# Patient Record
Sex: FEMALE | Race: BLACK OR AFRICAN AMERICAN | Employment: FULL TIME | URBAN - METROPOLITAN AREA
[De-identification: names, ages, dates, MRNs, and addresses within clinical notes are randomized per-mention and may not be internally consistent; named-entity substitution may affect disease eponyms.]

---

## 2017-07-05 ENCOUNTER — OFFICE VISIT (OUTPATIENT)
Dept: INTERNAL MEDICINE CLINIC | Age: 47
End: 2017-07-05

## 2017-07-05 VITALS
WEIGHT: 212 LBS | HEART RATE: 84 BPM | BODY MASS INDEX: 33.27 KG/M2 | HEIGHT: 67 IN | OXYGEN SATURATION: 99 % | SYSTOLIC BLOOD PRESSURE: 129 MMHG | DIASTOLIC BLOOD PRESSURE: 85 MMHG | RESPIRATION RATE: 18 BRPM | TEMPERATURE: 98.3 F

## 2017-07-05 DIAGNOSIS — K13.79 MOUTH SORE: Primary | ICD-10-CM

## 2017-07-05 RX ORDER — PENICILLIN V POTASSIUM 500 MG/1
500 TABLET, FILM COATED ORAL 3 TIMES DAILY
Qty: 30 TAB | Refills: 0 | Status: SHIPPED | OUTPATIENT
Start: 2017-07-05 | End: 2019-01-09

## 2017-07-05 RX ORDER — LATANOPROST 50 UG/ML
SOLUTION/ DROPS OPHTHALMIC
Refills: 0 | COMMUNITY
Start: 2017-04-11 | End: 2019-01-09

## 2017-07-05 NOTE — PROGRESS NOTES
Chief Complaint   Patient presents with    Mouth Pain     pt states she bit down on her cheek a little over a week ago and its now painful and feel like its swollen      1. Have you been to the ER, urgent care clinic since your last visit? Hospitalized since your last visit? No    2. Have you seen or consulted any other health care providers outside of the [address] [place] [address] since your last visit? Include any pap smears or colon screening.  Yes Where: endo

## 2017-07-05 NOTE — PROGRESS NOTES
HISTORY OF PRESENT ILLNESS  Nely Moraes is a 52 y.o. female. HPI Ms. Charisse Teresa is here for pain in the right side of her cheek. She accidentally bit the inside of her cheek and has re-bit it a few times. She has had some increasing pain in her cheek and in her jaw. She attempted to get in with her dentist, but he is out of town. She does have her wisdom teeth in that give her some issues from time to time. She denies fever. Review of Systems   Constitutional: Negative. HENT: Negative. Mouth pain   Eyes: Negative. Respiratory: Negative. Physical Exam   Constitutional: She appears well-developed and well-nourished. No distress. HENT:   Head: Normocephalic and atraumatic. There is a swollen area in her right inner cheek. Does not appear to be an abscess. Advised her that I can give her PCN to start, but she needs to follow up with her dentist, b/c I am not certain that this area in her cheek is causing her sx. Cardiovascular: Normal rate. Pulmonary/Chest: Effort normal.   Lymphadenopathy:     She has no cervical adenopathy. Visit Vitals    /85 (BP 1 Location: Left arm, BP Patient Position: Sitting)    Pulse 84    Temp 98.3 °F (36.8 °C) (Oral)    Resp 18    Ht 5' 7\" (1.702 m)    Wt 212 lb (96.2 kg)    SpO2 99%    BMI 33.2 kg/m2       ASSESSMENT and PLAN    ICD-10-CM ICD-9-CM    1. Mouth sore K13.79 528.9 l      penicillin v potassium (VEETID) 500 mg tablet     Pt verbalized understanding of their condition and diagnoses, treatment plan,  as well as side effects of any new medications prescribed.

## 2018-10-22 ENCOUNTER — OFFICE VISIT (OUTPATIENT)
Dept: INTERNAL MEDICINE CLINIC | Age: 48
End: 2018-10-22

## 2018-10-22 VITALS
RESPIRATION RATE: 18 BRPM | HEIGHT: 67 IN | TEMPERATURE: 98.8 F | DIASTOLIC BLOOD PRESSURE: 98 MMHG | SYSTOLIC BLOOD PRESSURE: 159 MMHG | OXYGEN SATURATION: 98 % | BODY MASS INDEX: 34.37 KG/M2 | WEIGHT: 219 LBS | HEART RATE: 86 BPM

## 2018-10-22 DIAGNOSIS — G89.29 CHRONIC PAIN OF RIGHT KNEE: Primary | ICD-10-CM

## 2018-10-22 DIAGNOSIS — E66.9 OBESITY (BMI 30-39.9): ICD-10-CM

## 2018-10-22 DIAGNOSIS — M25.561 CHRONIC PAIN OF RIGHT KNEE: Primary | ICD-10-CM

## 2018-10-22 NOTE — PROGRESS NOTES
HISTORY OF PRESENT ILLNESS Alonso Balbuena is a 50 y.o. female. HPI Ms. Marcello Manuel is here for c/o several month history of right leg pain. 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 She does work out regularly. After working out last week she had increased pain and swelling. She went to [facility] and was called the next day after the radiologist read the xray. She was told essentially there were arthritic changes. I don't have the report. She is in a knee immobilizer. She saw Dr. Hazel Salazar last week and her BP was elevated as well as elevated at [facility]. She said Dr. Hazel Salazar checked her cholesterol and that was also high, but he advised her to work on lifestyle changes first, before medication. Review of Systems Cardiovascular: Negative. Musculoskeletal: Positive for joint pain (right knee pain, stiffness. She had swelling on Friday and couldn't bear weight. She was given Motrin 800mg but states it bothers her stomach. ). Physical Exam  
Constitutional: She is oriented to person, place, and time. She appears well-developed and well-nourished. No distress. HENT:  
Head: Normocephalic and atraumatic. Cardiovascular: Normal rate and regular rhythm. Pulmonary/Chest: Effort normal and breath sounds normal. She has no wheezes. Musculoskeletal: She exhibits no edema. Right knee: She exhibits swelling (slight). She exhibits normal range of motion and no deformity. Tenderness found. Medial joint line tenderness noted. Neurological: She is alert and oriented to person, place, and time. Psychiatric: She has a normal mood and affect. Her behavior is normal. Judgment and thought content normal.  
 
Visit Vitals BP (!) 162/111 (BP 1 Location: Left arm, BP Patient Position: Sitting) Pulse 86 Temp 98.8 °F (37.1 °C) (Oral) Resp 18 Ht 5' 7\" (1.702 m) Wt 219 lb (99.3 kg) SpO2 98% BMI 34.30 kg/m² Wt Readings from Last 3 Encounters:  
10/22/18 219 lb (99.3 kg) 07/05/17 212 lb (96.2 kg) 01/21/16 213 lb (96.6 kg) ASSESSMENT and PLAN 
  ICD-10-CM ICD-9-CM 1. Chronic pain of right knee M25.561 719.46 REFERRAL TO ORTHOPEDICS  
 G89.29 338.29   
2. Obesity (BMI 30-39. 9) E66.9 278.00 Recommend increase exercise, diet and weight reduction 
  
she will call [facility]. She has been a pt there before. She will d/c motrin b/c it is bothering her stomach. Advised tylenol for pain. Pt verbalized understanding of their condition and diagnoses, treatment plan,  as well as side effects of any new medications prescribed.

## 2018-10-22 NOTE — PROGRESS NOTES
Chief Complaint Patient presents with  Knee Pain  
  left pt states her knee has been giving her trouble but she kept putting it off went to the gym on Friday and was barely able to walk after 1. Have you been to the ER, urgent care clinic since your last visit? Hospitalized since your last visit? No 
 
2. Have you seen or consulted any other health care providers outside of the [address] [place] [address] since your last visit? Include any pap smears or colon screening.  No

## 2019-01-03 ENCOUNTER — OFFICE VISIT (OUTPATIENT)
Dept: INTERNAL MEDICINE CLINIC | Age: 49
End: 2019-01-03

## 2019-01-09 ENCOUNTER — OFFICE VISIT (OUTPATIENT)
Dept: INTERNAL MEDICINE CLINIC | Age: 49
End: 2019-01-09

## 2019-01-09 VITALS
OXYGEN SATURATION: 98 % | HEIGHT: 67 IN | WEIGHT: 218 LBS | RESPIRATION RATE: 18 BRPM | BODY MASS INDEX: 34.21 KG/M2 | SYSTOLIC BLOOD PRESSURE: 148 MMHG | DIASTOLIC BLOOD PRESSURE: 89 MMHG | HEART RATE: 89 BPM | TEMPERATURE: 98.2 F

## 2019-01-09 DIAGNOSIS — S83.281A TEAR OF LATERAL MENISCUS OF RIGHT KNEE, CURRENT, UNSPECIFIED TEAR TYPE, INITIAL ENCOUNTER: Primary | ICD-10-CM

## 2019-01-09 DIAGNOSIS — I10 ESSENTIAL HYPERTENSION: ICD-10-CM

## 2019-01-09 DIAGNOSIS — Z01.818 PRE-OP EXAMINATION: ICD-10-CM

## 2019-01-09 RX ORDER — LOSARTAN POTASSIUM 50 MG/1
50 TABLET ORAL DAILY
Qty: 90 TAB | Refills: 0 | Status: SHIPPED | OUTPATIENT
Start: 2019-01-09

## 2019-01-09 NOTE — PROGRESS NOTES
Chief Complaint   Patient presents with    Pre-op Exam     1. Have you been to the ER, urgent care clinic since your last visit? Hospitalized since your last visit? No    2. Have you seen or consulted any other health care providers outside of the [address] [place] [address] since your last visit? Include any pap smears or colon screening.  No

## 2019-01-09 NOTE — PROGRESS NOTES
HISTORY OF PRESENT ILLNESS  Tricia Blackburn is a 50 y.o. female. HPI Ms. Shanice Duque is here for pre-op clearance for right knee arthroscopy. She recalls feeling right pain at the gym several months ago. She had an MRI which confirmed a lateral meniscal tear which is requiring surgery. Past Medical History:   Diagnosis Date    Celiac disease     Essential hypertension 2019    GERD (gastroesophageal reflux disease)     Thyroid disease     hypothryoid     Past Surgical History:   Procedure Laterality Date    HX  SECTION      HX CYST REMOVAL      right ovarian    HX TUBAL LIGATION       Current Outpatient Medications   Medication Sig    losartan (COZAAR) 50 mg tablet Take 1 Tab by mouth daily.  LEVOTHYROXINE SODIUM (SYNTHROID PO) Take 100 mcg by mouth. Family History   Problem Relation Age of Onset    Hypertension Mother 48    Heart Attack Father 48       Review of Systems   Constitutional: Negative. HENT: Negative. Eyes: Negative. Respiratory: Negative. Cardiovascular: Negative. Gastrointestinal: Positive for heartburn (takes nexium. Had EGD in October. ). Musculoskeletal: Positive for joint pain (right knee). Neurological: Positive for headaches (attributes headaches to migraines). Psychiatric/Behavioral: Negative. Physical Exam   Constitutional: She is oriented to person, place, and time. She appears well-developed and well-nourished. No distress. HENT:   Head: Normocephalic and atraumatic. Eyes: Conjunctivae are normal.   Neck: Normal range of motion. Neck supple. Cardiovascular: Normal rate and regular rhythm. No murmur heard. Pulmonary/Chest: Effort normal and breath sounds normal. She has no wheezes. Musculoskeletal: She exhibits no edema. Right knee: She exhibits swelling. Tenderness found. Lateral joint line tenderness noted. Neurological: She is alert and oriented to person, place, and time.    Psychiatric: She has a normal mood and affect. Her behavior is normal. Judgment and thought content normal.     Visit Vitals  /89 (BP 1 Location: Left arm, BP Patient Position: Sitting)   Pulse 89   Temp 98.2 °F (36.8 °C) (Oral)   Resp 18   Ht 5' 7\" (1.702 m)   Wt 218 lb (98.9 kg)   SpO2 98%   BMI 34.14 kg/m²     BP has been elevated on several occasions. She has noticed it running higher as well. Will start medication. Advised to work on diet, weight loss, however she does have +family history of HTN, heart disease. ASSESSMENT and PLAN    ICD-10-CM ICD-9-CM    1. Tear of lateral meniscus of right knee, current, unspecified tear type, initial encounter S83.281A 836.1 Cleared for surgery. 2. Pre-op examination Z01.818 V72.84    3. Essential hypertension I10 401.9 losartan (COZAAR) 50 mg tablet - started today. Pt verbalized understanding of their condition and diagnoses, treatment plan,  as well as side effects of any new medications prescribed.

## 2019-01-09 NOTE — PATIENT INSTRUCTIONS
Learning About ARBs  Introduction    ARBs (angiotensin II receptor blockers) block a hormone that makes blood vessels narrow. As a result, the blood vessels relax and widen. This lowers blood pressure. ARBs also put more water and salt into the urine. This also lowers blood pressure. ARBs can treat:  · High blood pressure. · Coronary artery disease. · Heart failure. They also may be used to help your kidneys when you have diabetes. Examples  ARBs include:  · Candesartan (Atacand). · Irbesartan (Avapro). · Losartan (Cozaar). This is not a complete list of all ARBs. Possible side effects  Side effects may include:  · Low blood pressure. You may feel dizzy and weak. · Diarrhea. · High potassium levels. You may have other side effects or reactions not listed here. Check the information that comes with your medicine. What to know about taking this medicine  · ARBs may be used if you had a cough when you tried to take an ACE inhibitor. ARBs are less likely to cause a cough. · You may need regular blood tests. · Take your medicines exactly as prescribed. Call your doctor if you think you are having a problem with your medicine. · Tell your doctor or pharmacist all the medicines you take. This includes over-the-counter medicines, vitamins, herbal products, and supplements. Taking some medicines together can cause problems. · You should not take ARBs if you are pregnant or planning to become pregnant. Where can you learn more? Go to http://daniel-stef.info/. Enter K212 in the search box to learn more about \"Learning About ARBs. \"  Current as of: December 6, 2017  Content Version: 11.8  © 8106-4603 [employer]. Care instructions adapted under license by [employer] (which disclaims liability or warranty for this information).  If you have questions about a medical condition or this instruction, always ask your healthcare professional. [facility] disclaims any warranty or liability for your use of this information.